# Patient Record
Sex: FEMALE | Race: BLACK OR AFRICAN AMERICAN
[De-identification: names, ages, dates, MRNs, and addresses within clinical notes are randomized per-mention and may not be internally consistent; named-entity substitution may affect disease eponyms.]

---

## 2019-01-12 NOTE — EDPHY
H & P


Source: Patient, Family, Police, EMS


Exam Limitations: Clinical condition


Time Seen by Provider: 01/12/19 16:26


HPI/ROS: 





HPI





CHIEF COMPLAINT:  Acute agitated combative behavior requiring restraints by EMS 

and police escort.





HISTORY OF PRESENT ILLNESS:  28-year-old female unknown medical history 

presents emergency room by EMS with police escort restrained for acute agitated 

combative behavior.  The patient was out in traffic fighting with cars and 

screaming at traffic acting psychotic per EMS and police.  The patient then 

lunged at the police requiring them to place her in handcuffs.  EMS was 

contacted she was given 5 mg IM Versed.





She arrives to the emergency room sedated, sleeping.  She received 5 mg IM 

Versed prior to arrival.





She is not acutely agitated here.





History is unobtainable due to sedation.





History comes only from police and EMS.


No trauma on exam.


No trauma reported








Past Medical History:  Unknown





Past Surgical History:  Unknown





Social History:  Unknown





Family History:  Unknown








ROS   


REVIEW OF SYSTEMS:


Sedation prior to arrival.





Exam   


Constitutional  sleeping.  Stated prior to arrival.  Vital signs noted.  Blood 

pressure noted 89s/50s


Eyes   normal conjunctivae and sclera, EOMI, PERRLA. 


HENT   normal inspection, atraumatic, moist mucus membranes, no epistaxis, neck 

supple/ no meningismus, no raccoon eyes. 


Respiratory   clear to auscultation bilaterally, normal breath sounds, no 

respiratory distress, no wheezing. 


Cardiovascular   rate normal, regular rhythm, no murmur, no edema, distal 

pulses normal. 


Gastrointestinal   soft, non-tender, no rebound, no guarding, normal bowel 

sounds, no distension, no pulsatile mass. 


Genitourinary   no CVA tenderness. 


Musculoskeletal  no midline vertebral tenderness, full range of motion, no calf 

swelling, no tenderness of extremities, no meningismus, good pulses, 

neurovascularly intact.


Skin   pink, warm, & dry, no rash, skin atraumatic. 


Neurologic  lethargic, sleeping.  Snoring


Psychiatric   normal mood/affect. 


Heme/Lymph/Immune   no lymphadenopathy.





Differential Diagnosis:  The includes but is not limited to in a particular 

order acute psychosis, drug intoxication, acute agitation, excited delirium





Medical Decision Making:  Plan for this patient cardiac monitor, IV 

establishment IV fluid bolus, basic blood work, drug screen.  Re-evaluate.  

Monitor for worsening condition.  Monitor closely for further sedation.  Once 

the patient is more alert and awake will obtain a history.





Re-evaluation:


2200:  Patient here in emergency room was highly intoxicated with alcohol 

received Versed prior to arrival.  She was out running in traffic in acting 

erratically.


Patient is now much more sober needs mental health evaluation.





Serum alcohol level 230. 





2223PM: Patient signed over to Dr. Vasquez  Pending Sobriety at that needs re-

evaluation.  Most likely will need mental health evaluation.


Blood pressure 104/62








Blood pressure 104/62 (Ganesh Dennis)





6:38 a.m.-


Patient was seen by the mental health evaluator now that she is sober.  She is 

not suicidal or homicidal and does not have any psychosis.  The hold will be 

lifted and she will be discharged home. (Hailey Vasquez)


Constitutional: 


 Initial Vital Signs











Temperature (C)  36.4 C   01/12/19 16:33


 


Heart Rate  88   01/12/19 16:33


 


Respiratory Rate  19   01/12/19 16:33


 


Blood Pressure  86/52 L  01/12/19 16:33


 


O2 Sat (%)  93   01/12/19 16:33








 











O2 Delivery Mode               Room Air














Allergies/Adverse Reactions: 


 





No Known Allergies Allergy (Unverified 01/12/19 16:44)


 








Home Medications: 














 Medication  Instructions  Recorded


 


Unobtainable  01/12/19














- Data Points


Laboratory Results: 


 Laboratory Results





 01/12/19 16:18 





 01/12/19 16:18 





 











  01/12/19 01/12/19





  21:29 21:29


 


Urine Color  YELLOW   





   


 


Urine Appearance  CLEAR   





   


 


Urine pH  6.0   





   (5.0-7.5)  


 


Ur Specific Gravity  1.010   





   (1.002-1.030)  


 


Urine Protein  NEGATIVE   





   (NEGATIVE)  


 


Urine Ketones  NEGATIVE   





   (NEGATIVE)  


 


Urine Blood  NEGATIVE   





   (NEGATIVE)  


 


Urine Nitrate  NEGATIVE   





   (NEGATIVE)  


 


Urine Bilirubin  NEGATIVE   





   (NEGATIVE)  


 


Urine Urobilinogen  NEGATIVE EU EU  





   (0.2-1.0)  


 


Ur Leukocyte Esterase  NEGATIVE   





   (NEGATIVE)  


 


Urine Glucose  NEGATIVE   





   (NEGATIVE)  


 


Urine Opiates Screen    NEGATIVE 





    (NEGATIVE) 


 


Urine Barbiturates    NEGATIVE 





    (NEGATIVE) 


 


Ur Phencyclidine Scrn    NEGATIVE 





    (NEGATIVE) 


 


Ur Amphetamine Screen    NEGATIVE 





    (NEGATIVE) 


 


U Benzodiazepines Scrn    NEGATIVE 





    (NEGATIVE) 


 


Urine Cocaine Screen    NEGATIVE 





    (NEGATIVE) 


 


U Marijuana (THC) Screen    NON-NEGATIVE  H 





    (NEGATIVE) 











Medications Given: 


 








Discontinued Medications





Sodium Chloride (Ns)  1,000 mls @ 0 mls/hr IV EDNOW ONE; Wide Open


   PRN Reason: Protocol


   Stop: 01/12/19 16:27


   Last Admin: 01/12/19 16:42 Dose:  1,000 mls


Sodium Chloride (Ns)  1,000 mls @ 0 mls/hr IV EDNOW ONE; Wide Open


   PRN Reason: Protocol


   Stop: 01/12/19 17:58


   Last Admin: 01/12/19 17:57 Dose:  1,000 mls








Departure





- Departure


Disposition: Home, Routine, Self-Care


Clinical Impression: 


Alcohol intoxication


Qualifiers:


 Complication of substance-induced condition: with delirium Qualified Code(s): 

F10.921 - Alcohol use, unspecified with intoxication delirium





Condition: Good


Instructions:  At-Risk Alcohol Use (ED)


Additional Instructions: 


Please return to the emergency department if your worse in any way.


Referrals: 


[facility],. [Clinic] - As per Instructions

## 2019-01-13 NOTE — ASMTLCPROG
Notes

 

Note:                         

Notes:

Pt brought in on M1 hold for being gravely disabled, a danger to others and to herself, while 

intoxicated. Pt's BAL was 230.  [facility] clinician met with pt, when she was sober,to evaluate whether 

she required a mental health assessment.  Pt alert and eager to engage in assessment.  Behavior was 


appropriate and cooperative. It was never aggressive; mood was anxious, but stable.  Thinking was 

organized, linear and lucid.  She denied any hallucinations. She has experienced SI in the 

past, but denies it currently.  She denies ever planning for or attempting suicide.  



 She has no memory of last night aside from drinking beer.  She states that she is homeless.  She 

normally drinks 1-4 beers a day, beginning in the late evening. Yesterday she began drinking at 

1:00PM.  She reports having been sober for 48 hours prior. This is the first time she has blacked 

out.  She reports drinking on and off for 6-7 years, ever since moving here from [place]. She 

states that if she can use marijuana, then she will not drink.  "Marijuana helps me pass time 

without thinking about things".



Pt went through [facility] and was assigned to [facility].  Due to an event, which she 

could not provide details for, she was unable to return there and has been sleeping in the [facility].  She states that on nights that it is not open and she sleeps outside, she 

drinks.  Pt was encouraged to return to [facility] to seek reassessment for [facility].  Pt was given contact information for [facility], [facility], [facility] 

for [facility].  The last 3 agencies provide both mental health and substance abuse counseling.



Pt states that she begins [employer] tomorrow; she does not recall what class is on Monday's schedule.

 

Date Signed:  01/13/2019 06:55 AM

Electronically Signed By:Danay Guzman

## 2019-01-16 NOTE — EDPHY
H & P


Stated Complaint: fell onto left knee two days ago


Time Seen by Provider: 01/16/19 12:25


HPI/ROS: 


Chief complaint:  Left knee pain





History of present illness:  This is a 28-year-old female who presents to the 

emergency department with EMS for left knee pain.  She states 2 days ago she 

tripped and fell landing directly onto her knee.  Since then she has had pain.  

It makes difficult to sit down or stand up.  However she can still walk on it.  

She denies open wounds.  She denies abnormal coolness or paresthesias in the 

leg.  No other trauma reported.








- Personal History


LMP (Females 10-55): 8-14 Days Ago


Current Tetanus Diphtheria and Acellular Pertussis (TDAP): Yes





- Medical/Surgical History


Hx Asthma: No


Hx Chronic Respiratory Disease: No


Hx Diabetes: No


Hx Cardiac Disease: No


Hx Renal Disease: No


Hx Alcoholism: Yes


Hx HIV/AIDS: No


Hx Splenectomy or Spleen Trauma: No


Other PMH: Unknown.





- Social History


Smoking Status: Current every day smoker





- Physical Exam


Exam: 





General:  Alert, nontoxic.


Skin:  Old scars to the anterior knee, no acute wounds.


Musculoskeletal:  She reports severe pain even with very light touch to the 

anterior aspect of the knee.  The rest the knee is nontender.  She can flex and 

extend it well.  Knee joint appears grossly stable.


Vascular:  DP and PT pulses 2+.


Neurologic:  Sensation intact in the left leg.


Constitutional: 


 Initial Vital Signs











Temperature (C)  36.4 C   01/16/19 12:23


 


Heart Rate  96   01/16/19 12:23


 


Respiratory Rate  16   01/16/19 12:23


 


Blood Pressure  122/64 H  01/16/19 12:23


 


O2 Sat (%)  99   01/16/19 12:23








 











O2 Delivery Mode               Room Air














Allergies/Adverse Reactions: 


 





No Known Allergies Allergy (Unverified 01/12/19 16:44)


 








Home Medications: 














 Medication  Instructions  Recorded


 


Unobtainable  01/12/19














Medical Decision Making





- Diagnostics


Imaging Results: 


 Imaging Impressions





Knee X-Ray  01/16/19 12:22


Impression: There is no acute osseous abnormality identified.


 


If there is further clinical concern regarding the patient's ongoing knee pain, 

consider MR imaging.


 











Imaging: I viewed and interpreted images myself


Procedures: 





Procedure:  Splint placement.





A Velcro knee immobilizing splint was applied.  After application of the splint 

I returned and re-examined the patient.  The splint was adequately immobilizing 

the joint and distal to the splint the patient's circulation and sensation was 

intact.





Patient was fitted with crutches and given instructions


ED Course/Re-evaluation: 





Patient seen under the supervision of my secondary supervising physician Dr. Denver Phillips.  Patient presents for left knee pain after a traumatic injury.  The 

leg is neurovascularly intact.  X-rays negative.  Likely a soft tissue injury.  

Home care was discussed.  She is referred to Orthopedics for recheck.  Return 

precautions were given.


Differential Diagnosis: 





Included but not limited to contusion, bursitis, sprain or strain, meniscal 

injury, bony fracture





- Data Points


Medications Given: 


 








Discontinued Medications





Acetaminophen (Tylenol)  1,000 mg PO EDNOW ONE


   Stop: 01/16/19 12:56


   Last Admin: 01/16/19 13:01 Dose:  1,000 mg


Ibuprofen (Motrin)  600 mg PO EDNOW ONE


   Stop: 01/16/19 12:56


   Last Admin: 01/16/19 13:01 Dose:  600 mg








Departure





- Departure


Disposition: Home, Routine, Self-Care


Clinical Impression: 


Knee pain


Qualifiers:


 Chronicity: acute Laterality: left Qualified Code(s): M25.562 - Pain in left 

knee





Condition: Good


Instructions:  Knee Pain (ED)


Additional Instructions: 


Follow-up with orthopedics for continued evaluation and care





Use ibuprofen 600 mg 3 times a day for the next 2-3 days for pain control





If symptoms worsen or new symptoms develop return to the emergency room for 

recheck


Referrals: 


NONE *PRIMARY CARE P,. [Primary Care Provider] - As per Instructions


Lucas Edmonds MD [Medical Doctor] - As per Instructions

## 2019-01-23 NOTE — EDPHY
H & P


Stated Complaint: left knee pain x 1 week.


Time Seen by Provider: 01/23/19 17:55


HPI/ROS: 





CHIEF COMPLAINT:  Left knee pain





HISTORY OF PRESENT ILLNESS:  Patient is a 28-year-old homeless female who 

called an ambulance complaining of left knee pain today.  She was seen here a 

week ago for the same.  She states that she fell and kneeling position and had 

severe pain over the distal aspect of her patella.  She had x-rays done which 

were unremarkable.  She was discharged in a straight leg brace and receive 

Tylenol.  She states that she has done well until today when she took her brace 

off because she thought she was better.  Throughout the day her pain gradually 

worsened to the point where she states she could not walk.  She called an 

ambulance and was brought here.  However in the waiting room she began chasing 

some of the other patients around and was running on her knee.  She did not 

limp or show any signs of pain at the time.  She now tells me that she has 

severe pain and trouble moving her leg.  She is able to extend her knee without 

difficulty from the side of the gurney.  No obvious swelling or deformity.  No 

erythema


Severity:  Severe


Modifying factors:  Patient seems pain-free when not being examined or 

questioned.





REVIEW OF SYSTEMS:


Constitutional:  denies: chills, fever, recent illness, recent injury


EENTM: denies: blurred vision, double vision, nose congestion


Respiratory: denies: cough, shortness of breath


Cardiac: denies: chest pain, irregular heart rate, lightheadedness, palpitations


Gastrointestinal/Abdominal: denies: abdominal pain, diarrhea, nausea, vomiting, 

blood streaked stools


Genitourinary: denies: dysuria, frequency, hematuria, pain


Musculoskeletal:  See HPI


Skin: denies: lesions, rash, jaundice, bruising


Neurological: denies: headache, numbness, paresthesia, tingling, dizziness, 

weakness


Hematologic/Lymphatic: denies: blood clots, easy bleeding, easy bruising


Immunologic/allergic: denies: HIV/AIDS, transplant


 10 systems reviewed and negative except as noted





EXAM:


GENERAL:  Well-appearing, well-nourished and in no acute distress.


HEAD:  Atraumatic, normocephalic.


EYES:  Pupils equal round and reactive to light, extraocular movements intact, 

sclera anicteric, conjunctiva are normal.


ENT:  TMs normal, nares patent, oropharynx clear without exudates.  Moist 

mucous membranes.


NECK:  Normal range of motion, supple without lymphadenopathy or JVD.


LUNGS:  Breath sounds clear to auscultation bilaterally and equal.  No wheezes 

rales or rhonchi.


HEART:  Regular rate and rhythm without murmurs, rubs or gallops.


ABDOMEN:  Soft, nontender, normoactive bowel sounds.  No guarding, no rebound.  

No masses appreciated. 


BACK:  No CVA tenderness, no spinal tenderness, step-offs or deformities


EXTREMITIES:  Left knee pain, no swelling or deformity.  No erythema.  Able to 

extend knee from hanging off the side of the bed.  Able to bear weight.


NEUROLOGICAL:  Cranial nerves II through XII grossly intact.  Normal speech, 

normal gait.  5/5 strength, normal movement in all extremities, normal sensation

, normal reflexes


PSYCH:  Normal mood, normal affect.


SKIN:  Warm, dry, normal turgor, no visible rashes or lesions.








Source: Patient


Exam Limitations: No limitations





- Personal History


Current Tetanus Diphtheria and Acellular Pertussis (TDAP): Yes





- Medical/Surgical History


Hx Asthma: No


Hx Chronic Respiratory Disease: No


Hx Diabetes: No


Hx Cardiac Disease: No


Hx Renal Disease: No


Hx Alcoholism: Yes


Hx HIV/AIDS: No


Hx Splenectomy or Spleen Trauma: No


Other PMH: Unknown.





- Social History


Smoking Status: Current every day smoker


Constitutional: 


 Initial Vital Signs











Temperature (C)  37.1 C   01/23/19 17:06


 


Heart Rate  76   01/23/19 17:06


 


Respiratory Rate  18   01/23/19 17:06


 


Blood Pressure  98/67 L  01/23/19 17:06


 


O2 Sat (%)  96   01/23/19 17:06








 











O2 Delivery Mode               Room Air














Allergies/Adverse Reactions: 


 





No Known Allergies Allergy (Unverified 01/12/19 16:44)


 








Home Medications: 














 Medication  Instructions  Recorded


 


Unobtainable  01/12/19














Medical Decision Making





- Diagnostics


Imaging Results: 


 Imaging Impressions





Knee X-Ray  01/23/19 17:54


Impression: No acute osseous findings.


 


 











Imaging: Discussed imaging studies w/ On call Radiologist


ED Course/Re-evaluation: 





I suggested that we repeat the x-ray and treat her again with Tylenol and 

replace her straight leg brace.  She asked how long that were taken tells me 

that she needs to leave before it gets too dark.





6:30 p.m. The patient's x-rays reassuring.  No sign of fracture.  Has been 

placed back in a straight leg brace and is able to ambulate.  Will discharge at 

this time.  She is eager to go.  Discussed indications for returning.


Differential Diagnosis: 





Partial list of the Differential diagnosis considered include but were not 

limited to;  contusion, traumatic bursitis and although unlikely based on the 

history and physical exam, I also considered patella fracture, tendon rupture, 

tibial plateau fracture, occult fracture, compartment syndrome.  I discussed 

these differential diagnoses and the plan with the patient as well as the usual 

and expected course.  The patient understands that the diagnosis is provisional 

and that in medicine we are not always correct and that further workup is often 

warranted.  Usual and customary warnings were given.  All of the patient's 

questions were answered.  The patient was instructed to return to the emergency 

department should the symptoms at all worsen or return, otherwise to followup 

with the physician as we discussed.





- Data Points


Medications Given: 


 








Discontinued Medications





Acetaminophen (Tylenol)  1,000 mg PO EDNOW ONE


   Stop: 01/23/19 17:55


   Last Admin: 01/23/19 18:04 Dose:  1,000 mg








Departure





- Departure


Disposition: Home, Routine, Self-Care


Clinical Impression: 


 Left anterior knee pain





Condition: Fair


Instructions:  Knee Pain (ED)


Referrals: 


NONE *PRIMARY CARE P,. [Primary Care Provider] - As per Instructions


Frantz Pierre MD [Medical Doctor] - 5-7 days, call for appt.

## 2019-01-24 NOTE — ASMTCAGE
CAGE

 

Additional Comments           CM SBIRT order received from patient' svisit last 

                              evening. Chart reviewed.  Patient w history of 

                              alcoholism.  CM available to provide resources prn 

                                                                                  

                                               

 

Date Signed:  01/24/2019 12:36 PM

Electronically Signed By:Shalonda Parrish RN

## 2019-01-27 NOTE — EDPHY
H & P


Time Seen by Provider: 01/27/19 19:05


HPI/ROS: 





CHIEF COMPLAINT:  Medical screening for incarceration





HISTORY OF PRESENT ILLNESS:  28-year-old homeless female arrives via ambulance 

accompanied by police for medical screening prior to incarceration allegedly 

for outstanding warrants.  Per EMS and police she was found running in traffic, 

was running from police.  Eventually she was apprehended and brought to the ER.

  She has no complaints of pain or discomfort.  She was acting aggressively, 

spitting, necessitated 4 point restraints and a spit hernández.  She admits to 

alcohol use.  Denies:  Head injury, chest pain or injury, back pain injury, 

abdominal pain or injury, suicidal or homicidal ideation








REVIEW OF SYSTEMS:


10 systems reviewed and negative with the exception of the elements mentioned 

in the history of present illness








PAST MEDICAL & SURGICAL  HISTORY:  No pertinent medical or surgical history    





SOCIAL HISTORY: Self admitted alcohol use this evening.  Homeless    














************


PHYSICAL EXAM





(Prior to examination, patient consented to physical exam, hands were washed 

and my usual and customary physical exam procedures followed)


1) GENERAL: Untidy, foul-smelling, 4 point restraints in place with patient 

making aggressive gestures and movements trying to get out of the restraints.  

She is not actively spitting but there is spit on her spit hernández.  She is 

cursing.


2) HEAD: Normocephalic, atraumatic


3) HEENT: Pupils equal, round, reactive to light bilaterally.  Sclera 

anicteric. 


4) NECK: Full range of motion, no meningeal signs.


5) LUNGS: Clear auscultation bilaterally, no wheezes, no rhonchi, no 

retractions.   


6) HEART: Regular rate and rhythm, no murmur, no heave, no gallop.


7) ABDOMEN: No guarding, no rebound, no focal tenderness, negative McBurney's, 

negative Talbot's, negative Rovsing's, negative peritoneal sign,


8) MUSCULOSKELETAL: Moving all extremities, no focal areas of tenderness, no 

obvious trauma.  No peripheral edema or discoloration.


9) BACK: No CVA tenderness, no midline vertebral tenderness, no fluctuance, no 

step-off, no obvious trauma, no visual or palpable abnormality. 


10) SKIN: No rash, no petechiae. 


11) Psychiatric:  Patient is oriented X 3, there is no agitation.








***************





DIFFERENTIAL DIAGNOSIS:   In no particular orderincluding but not limited to 

hypoglycemia, infectious process, electrolyte abnormality, head injury and 

intoxicants.








- Medical/Surgical History


Hx Asthma: No


Hx Chronic Respiratory Disease: No


Hx Diabetes: No


Hx Cardiac Disease: No


Hx Renal Disease: No


Hx Alcoholism: Yes


Hx HIV/AIDS: No


Hx Splenectomy or Spleen Trauma: No


Other PMH: Unknown.





- Social History


Smoking Status: Current every day smoker


Constitutional: 


 Initial Vital Signs











Heart Rate  105 H  01/27/19 19:11


 


Respiratory Rate  18   01/27/19 19:11


 


Blood Pressure  115/70   01/27/19 19:11


 


O2 Sat (%)  93   01/27/19 19:11








 











O2 Delivery Mode               Room Air














Allergies/Adverse Reactions: 


 





No Known Allergies Allergy (Unverified 01/12/19 16:44)


 








Home Medications: 














 Medication  Instructions  Recorded


 


Unobtainable  01/12/19














Medical Decision Making


ED Course/Re-evaluation: 





7:13 p.m.:  Breathalyzer alcohol 162, reviewed old medical records.  Patient 

has been seen emergency department numerous instances.  Specifically on 1/12/ 2019 she was seen for acute agitation and combative behavior which resolved 

upon becoming sober from alcohol.  She was also evaluated previously in the ER 

after she self presented and was chasing patients in the waiting room.  At this 

time I do not identify any focal areas of trauma the patient has no complaints 

of pain or discomfort.  Do not think that imaging of the head or other body 

region indicated at this time.  She will be discharged to residential, has been 

medically screened for incarceration.  Care of patient under supervision of 

primary  supervising physician Dr Denver Phillips with whom I discussed case.





Departure





- Departure


Disposition: Law Enforcement/Court/care home


Clinical Impression: 


 Alcohol intoxication





Condition: Good


Instructions:  Alcohol Intoxication (ED)


Additional Instructions: 


You have been medically screened for incarceration


Referrals: 


Patient,NotPresent [Unknown] - As per Instructions

## 2019-01-29 NOTE — EDPHY
H & P


Stated Complaint: cough,chills


Time Seen by Provider: 01/29/19 00:50


HPI/ROS: 





HPI


The patient presents with cough, rhinorrhea, subjective fevers since 7:00 p.m. 

Tonight.  She is brought in by ambulance from the [facility] 

where she presented earlier tonight.  She requested ambulance transport to the 

emergency department for further evaluation.  She says she has had a mild dry 

cough associated with clear rhinorrhea and says she has felt both hot and cold 

7 7:00 a.m..  She is not aware of any sick contacts.  She denies any other 

symptoms.





Upon review of old records, she has been in the emergency department several 

times this month for alcohol and medical clearance for [facility].





REVIEW OF SYSTEMS


10 systems were reviewed and negative with the exception of the elements 

mentioned in the history of present illness.





PMHx:  Denies diabetes or hypertension





Soc Hx:  From [place] originally, previous visits for alcohol intoxication








PHYSICAL


General Appearance: Alert, no distress


Eyes: Pupils equal and round no pallor or injection


ENT, Mouth: Mucous membranes moist, posterior pharynx unremarkable


Respiratory: There are no retractions, lungs are clear to auscultation


Cardiovascular:  Regular rate and rhythm 


Gastrointestinal:  Abdomen is soft and non-tender, no masses, bowel sounds 

normal 


Neurological:  A&O, moves all extremities


Skin:  Warm and dry, no rashes


Musculoskeletal: Neck is supple non tender 


Extremities:  symmetrical, full range of motion 


Psychiatric:  Patient is oriented X 3, there is no agitation 





Source: Patient, EMS


Exam Limitations: No limitations





- Personal History


LMP (Females 10-55): Over 28 Days Ago


Current Tetanus/Diphtheria Vaccine: Yes





- Medical/Surgical History


Hx Asthma: No


Hx Chronic Respiratory Disease: No


Hx Diabetes: No


Hx Cardiac Disease: No


Hx Renal Disease: No


Hx Alcoholism: Yes


Hx HIV/AIDS: No


Hx Splenectomy or Spleen Trauma: No


Other PMH: denies





- Social History


Smoking Status: Current every day smoker


Constitutional: 


 Initial Vital Signs











Temperature (C)  36.9 C   01/29/19 00:50


 


Heart Rate  56 L  01/29/19 00:50


 


Respiratory Rate  16   01/29/19 00:50


 


Blood Pressure  119/76   01/29/19 00:50


 


O2 Sat (%)  97   01/29/19 00:50








 











O2 Delivery Mode               Room Air














Allergies/Adverse Reactions: 


 





No Known Allergies Allergy (Verified 01/29/19 00:52)


 








Home Medications: 














 Medication  Instructions  Recorded


 


NK [No Known Home Meds]  01/29/19














Medical Decision Making


Differential Diagnosis: 





28-year-old homeless female presents brought in by ambulance from the arc with 

URI type symptoms.  Vital signs are normal.  Patient is well-appearing.





Suspect URI, shelter seeking behavior, less likely influenza.





I have offered her ibuprofen and Tylenol, however she declines.  She will be 

discharged.





Departure





- Departure


Disposition: Home, Routine, Self-Care


Clinical Impression: 


 Homeless





URI (upper respiratory infection)


Qualifiers:


 URI type: unspecified URI Qualified Code(s): J06.9 - Acute upper respiratory 

infection, unspecified





Condition: Good


Instructions:  Upper Respiratory Infection (ED)


Additional Instructions: 


Please make sure to drink plenty of fluids.  You can take ibuprofen or Tylenol 

as needed for your symptoms.


Referrals: 


[facility],. [Clinic] - As per Instructions
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
## 2019-03-25 ENCOUNTER — HOSPITAL ENCOUNTER (EMERGENCY)
Dept: HOSPITAL 80 - FED | Age: 29
LOS: 1 days | Discharge: HOME | End: 2019-03-26
Payer: COMMERCIAL

## 2019-03-25 DIAGNOSIS — E87.6: ICD-10-CM

## 2019-03-25 DIAGNOSIS — F10.921: Primary | ICD-10-CM

## 2019-03-25 DIAGNOSIS — E86.9: ICD-10-CM

## 2019-03-25 PROCEDURE — G0480 DRUG TEST DEF 1-7 CLASSES: HCPCS
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
## 2019-03-26 VITALS — SYSTOLIC BLOOD PRESSURE: 107 MMHG | DIASTOLIC BLOOD PRESSURE: 76 MMHG

## 2019-03-26 LAB — PLATELET # BLD: 198 10^3/UL (ref 150–400)

## 2019-03-26 RX ADMIN — POTASSIUM CHLORIDE SCH MLS: 10 INJECTION, SOLUTION INTRAVENOUS at 01:52

## 2019-03-26 RX ADMIN — POTASSIUM CHLORIDE SCH MLS: 10 INJECTION, SOLUTION INTRAVENOUS at 00:53
